# Patient Record
Sex: FEMALE | Race: WHITE | NOT HISPANIC OR LATINO
[De-identification: names, ages, dates, MRNs, and addresses within clinical notes are randomized per-mention and may not be internally consistent; named-entity substitution may affect disease eponyms.]

---

## 2019-07-22 PROBLEM — Z00.00 ENCOUNTER FOR PREVENTIVE HEALTH EXAMINATION: Status: ACTIVE | Noted: 2019-07-22

## 2019-08-08 ENCOUNTER — APPOINTMENT (OUTPATIENT)
Dept: INTERNAL MEDICINE | Facility: CLINIC | Age: 49
End: 2019-08-08
Payer: COMMERCIAL

## 2019-08-08 VITALS
SYSTOLIC BLOOD PRESSURE: 128 MMHG | WEIGHT: 105 LBS | DIASTOLIC BLOOD PRESSURE: 70 MMHG | HEIGHT: 62 IN | BODY MASS INDEX: 19.32 KG/M2

## 2019-08-08 DIAGNOSIS — Z78.9 OTHER SPECIFIED HEALTH STATUS: ICD-10-CM

## 2019-08-08 DIAGNOSIS — A77.0 SPOTTED FEVER DUE TO RICKETTSIA RICKETTSII: ICD-10-CM

## 2019-08-08 DIAGNOSIS — R76.8 OTHER SPECIFIED ABNORMAL IMMUNOLOGICAL FINDINGS IN SERUM: ICD-10-CM

## 2019-08-08 DIAGNOSIS — Z71.1 PERSON WITH FEARED HEALTH COMPLAINT IN WHOM NO DIAGNOSIS IS MADE: ICD-10-CM

## 2019-08-08 PROCEDURE — 99205 OFFICE O/P NEW HI 60 MIN: CPT

## 2019-08-08 NOTE — ASSESSMENT
[FreeTextEntry1] : Patient has a normal physical exam and has no evidence of any tick-related disease. The laboratory data is showing positive  IgM titers are consistent with a biologic false positive and have no clinical significance. The fact that the patient is never generated in IgG RMSF titer is also consistent with a biologic false positive\par As I explained to the patient [place] spotted fever is an acute infectious disease with devastating consequences of untreated without any postinfectious sequela.\par Patient's symptoms are not consistent with an infectious disease and specifically has no evidence of RMSF I recommended that she does not get further testing as her IgM titer may remain positive without any clinical significance\par  [Treatment Education] : treatment education [Treatment Adherence] : treatment adherence

## 2019-08-08 NOTE — PHYSICAL EXAM
[General Appearance - Alert] : alert [General Appearance - In No Acute Distress] : in no acute distress [Sclera] : the sclera and conjunctiva were normal [PERRL With Normal Accommodation] : pupils were equal in size, round, reactive to light [Extraocular Movements] : extraocular movements were intact [Outer Ear] : the ears and nose were normal in appearance [Oropharynx] : the oropharynx was normal with no thrush [Neck Appearance] : the appearance of the neck was normal [Neck Cervical Mass (___cm)] : no neck mass was observed [Jugular Venous Distention Increased] : there was no jugular-venous distention [Thyroid Diffuse Enlargement] : the thyroid was not enlarged [Auscultation Breath Sounds / Voice Sounds] : lungs were clear to auscultation bilaterally [Heart Rate And Rhythm] : heart rate was normal and rhythm regular [Heart Sounds] : normal S1 and S2 [Murmurs] : no murmurs [Heart Sounds Gallop] : no gallops [Heart Sounds Pericardial Friction Rub] : no pericardial rub [Full Pulse] : the pedal pulses are present [Edema] : there was no peripheral edema [Bowel Sounds] : normal bowel sounds [Abdomen Soft] : soft [Abdomen Tenderness] : non-tender [] : no hepato-splenomegaly [Costovertebral Angle Tenderness] : no CVA tenderness [Abdomen Mass (___ Cm)] : no abdominal mass palpated [Deep Tendon Reflexes (DTR)] : deep tendon reflexes were 2+ and symmetric [No Palpable Adenopathy] : no palpable adenopathy [Sensation] : the sensory exam was normal to light touch and pinprick [No Focal Deficits] : no focal deficits [Oriented To Time, Place, And Person] : oriented to person, place, and time [Affect] : the affect was normal

## 2019-08-08 NOTE — HISTORY OF PRESENT ILLNESS
[FreeTextEntry1] : Patient is a very pleasant 49-year-old  female who is referred here for evaluation of positive [facility] spotted fever serology\par Patient works as a  and is an active  and marathon runner. She states 20 years ago she had Lyme disease and was treated without recurrence or symptoms\par \par Approximately January 2018 patient noted the onset of intermittent fatigue and malaise in August of 2018 head blood test done one of which showed a positive IgM titer to [place] spotted fever. IgG was negative at that time and she was treated with Vibramycin. Her symptoms have recurred twice since then each time the [place] at serology is IgM positive and persistent IgG negative. At no point did she have fever chills headaches or severe prostration. Most of her labs are drawn for fatigue. Serology for Lyme as well as Ehrlichia were negative.\par Patient denies a history of long-lasting fever and was asymptomatic when all the blood work was done

## 2019-08-08 NOTE — DATA REVIEWED
[FreeTextEntry1] : A length brought with the patient were reviewed and show no evidence of infectious disease\par Patient states she has no HIV risks and has been tested negative in

## 2023-02-15 ENCOUNTER — APPOINTMENT (OUTPATIENT)
Dept: OPHTHALMOLOGY | Facility: CLINIC | Age: 53
End: 2023-02-15
Payer: COMMERCIAL

## 2023-02-15 ENCOUNTER — NON-APPOINTMENT (OUTPATIENT)
Age: 53
End: 2023-02-15

## 2023-02-15 ENCOUNTER — APPOINTMENT (OUTPATIENT)
Dept: OPHTHALMOLOGY | Facility: CLINIC | Age: 53
End: 2023-02-15
Payer: SELF-PAY

## 2023-02-15 PROCEDURE — 92002 INTRM OPH EXAM NEW PATIENT: CPT

## 2023-02-15 PROCEDURE — ZZZZZ: CPT

## 2023-02-15 PROCEDURE — 92015 DETERMINE REFRACTIVE STATE: CPT

## 2023-03-15 ENCOUNTER — APPOINTMENT (OUTPATIENT)
Dept: OPHTHALMOLOGY | Facility: CLINIC | Age: 53
End: 2023-03-15

## 2024-02-10 ENCOUNTER — NON-APPOINTMENT (OUTPATIENT)
Age: 54
End: 2024-02-10